# Patient Record
Sex: MALE | Race: WHITE
[De-identification: names, ages, dates, MRNs, and addresses within clinical notes are randomized per-mention and may not be internally consistent; named-entity substitution may affect disease eponyms.]

---

## 2020-02-06 ENCOUNTER — HOSPITAL ENCOUNTER (EMERGENCY)
Dept: HOSPITAL 46 - ED | Age: 50
Discharge: HOME | End: 2020-02-06
Payer: COMMERCIAL

## 2020-02-06 VITALS — BODY MASS INDEX: 44.67 KG/M2 | HEIGHT: 70 IN | WEIGHT: 312 LBS

## 2020-02-06 DIAGNOSIS — E87.6: ICD-10-CM

## 2020-02-06 DIAGNOSIS — I10: ICD-10-CM

## 2020-02-06 DIAGNOSIS — I49.3: Primary | ICD-10-CM

## 2020-02-06 NOTE — EKG
[facility]
                                    [address] [place] [address]
                                  [place]  [address]
_________________________________________________________________________________________
                                                                 Signed   
 
 
Sinus rhythm with frequent premature ventricular complexes
Minimal voltage criteria for LVH, may be normal variant
Nonspecific T wave abnormality
Abnormal ECG
No previous ECGs available
Confirmed by RAVINDER RAMIREZ MD ([id number]) on 2/6/2020 10:27:54 PM
 
 
 
 
 
 
 
 
 
 
 
 
 
 
 
 
 
 
 
 
 
 
 
 
 
 
 
 
 
 
 
 
 
 
 
 
 
 
 
    Electronically Signed By: RAVINDER RAMIREZ MD  02/06/20 2228
_________________________________________________________________________________________
PATIENT NAME:     LJCAMMIECARLOS TAYLOR                     
MEDICAL RECORD #: [id number]                     Electrocardiogram             
          ACCT #: [id number]  
DATE OF BIRTH:   [birth year]                                       
PHYSICIAN:   RAVINDER RAMIREZ MD                   REPORT #: [id number]
REPORT IS CONFIDENTIAL AND NOT TO BE RELEASED WITHOUT AUTHORIZATION

## 2021-04-02 ENCOUNTER — HOSPITAL ENCOUNTER (EMERGENCY)
Dept: HOSPITAL 46 - ED | Age: 51
Discharge: HOME | End: 2021-04-02
Payer: COMMERCIAL

## 2021-04-02 VITALS — WEIGHT: 304 LBS | HEIGHT: 70 IN | BODY MASS INDEX: 43.52 KG/M2

## 2021-04-02 DIAGNOSIS — Z88.0: ICD-10-CM

## 2021-04-02 DIAGNOSIS — I10: ICD-10-CM

## 2021-04-02 DIAGNOSIS — N13.2: Primary | ICD-10-CM

## 2021-04-02 DIAGNOSIS — Z79.899: ICD-10-CM

## 2023-07-14 ENCOUNTER — HOSPITAL ENCOUNTER (EMERGENCY)
Dept: HOSPITAL 46 - ED | Age: 53
Discharge: HOME | End: 2023-07-14
Payer: COMMERCIAL

## 2023-07-14 VITALS — HEIGHT: 70 IN | WEIGHT: 300.49 LBS | BODY MASS INDEX: 43.02 KG/M2

## 2023-07-14 VITALS — DIASTOLIC BLOOD PRESSURE: 85 MMHG | SYSTOLIC BLOOD PRESSURE: 152 MMHG

## 2023-07-14 DIAGNOSIS — R25.2: ICD-10-CM

## 2023-07-14 DIAGNOSIS — Z88.0: ICD-10-CM

## 2023-07-14 DIAGNOSIS — G47.30: ICD-10-CM

## 2023-07-14 DIAGNOSIS — R10.11: Primary | ICD-10-CM

## 2023-07-14 DIAGNOSIS — I10: ICD-10-CM

## 2023-07-14 DIAGNOSIS — Z88.8: ICD-10-CM

## 2023-07-14 DIAGNOSIS — Z79.899: ICD-10-CM

## 2023-07-14 NOTE — XMS
Continuity of Care Document
  Created on: 2023
 
 CAMMIE CALHOUN
 External Reference #: [id number]
 : 66
 Sex: Male
 
 Demographics
 
 
+-----------------------+----------------------+
| Address               | [address]      |
|                       | [place]  [address] |
+-----------------------+----------------------+
| Preferred Language    | Unknown              |
+-----------------------+----------------------+
| Marital Status        |               |
+-----------------------+----------------------+
| Caodaism Affiliation | Unknown              |
+-----------------------+----------------------+
| Race                  | White                |
+-----------------------+----------------------+
| Ethnic Group          | Unknown              |
+-----------------------+----------------------+
 
 
 Author
 
 
+--------------+--------------------------+
| Author       | [facility]                 |
+--------------+--------------------------+
| Organization | [facility]                 |
+--------------+--------------------------+
| Address      |  [address] |
|              | [place]  [address]     |
+--------------+--------------------------+
| Phone        | [phone]          |
+--------------+--------------------------+
 
 
 
 Care Team Providers
 
 
+-----------------------+-------------+-------------+
| Care Team Member Name | Role        | Phone       |
+-----------------------+-------------+-------------+
 Unavailable | Unavailable |
+-----------------------+-------------+-------------+
 
 
 
 Allergies
 No information.
 
 Encounters
 
 No information.
 
 Functional Status
 No information.
 
 Immunizations
 No information.
 
 Medications
 No information.
 
 Problems
 
 
+--------------------+-----------------------+------------+
|  date              |  description          |  facility  |
+--------------------+-----------------------+------------+
|  2022 10:59  |  Essential (primary)  |  [facility]       |
|                    | hypertension          |            |
+--------------------+-----------------------+------------+
|  2022 10:59  |  OTHER CHEST PAIN     |  [facility]       |
+--------------------+-----------------------+------------+
 
 
 
 Procedures
 No information.
 
 Results/Labs
 No information.
 
 Social History
 No information.
 
 Vital Signs
 No information.

## 2023-07-16 NOTE — EKG
[facility]
                                    [address]
                                  [place]  [address]
_________________________________________________________________________________________
                                                                 Signed   
 
 
Normal sinus rhythm
Normal ECG
When compared with ECG of 06-FEB-2020 10:50,
premature ventricular complexes are no longer present
 
Confirmed by ZOFIA FRANCO MD ([id number]) on 7/16/2023 6:51:52 AM
 
 
 
 
 
 
 
 
 
 
 
 
 
 
 
 
 
 
 
 
 
 
 
 
 
 
 
 
 
 
 
 
 
 
 
 
 
 
 
    Electronically Signed By: ZOFIA FRANCO  07/16/23 0652
_________________________________________________________________________________________
PATIENT NAME:     CAMMIE CALHOUN BRANDON                     
MEDICAL RECORD #: [id number]                     Electrocardiogram             
          ACCT #: [id number]  
DATE OF BIRTH:   [birth year]                                       
PHYSICIAN:   ZOFIA FRANCO                       REPORT #: [id number]
REPORT IS CONFIDENTIAL AND NOT TO BE RELEASED WITHOUT AUTHORIZATION

## 2024-07-23 VITALS — DIASTOLIC BLOOD PRESSURE: 92 MMHG | SYSTOLIC BLOOD PRESSURE: 170 MMHG

## 2024-07-25 ENCOUNTER — HOSPITAL ENCOUNTER (OUTPATIENT)
Dept: HOSPITAL 46 - DS | Age: 54
Discharge: HOME | End: 2024-07-25
Attending: SURGERY
Payer: COMMERCIAL

## 2024-07-25 VITALS — DIASTOLIC BLOOD PRESSURE: 68 MMHG | SYSTOLIC BLOOD PRESSURE: 132 MMHG

## 2024-07-25 VITALS — BODY MASS INDEX: 38.73 KG/M2 | HEIGHT: 70 IN | WEIGHT: 270.55 LBS

## 2024-07-25 VITALS — SYSTOLIC BLOOD PRESSURE: 140 MMHG | DIASTOLIC BLOOD PRESSURE: 70 MMHG

## 2024-07-25 VITALS — DIASTOLIC BLOOD PRESSURE: 81 MMHG | SYSTOLIC BLOOD PRESSURE: 142 MMHG

## 2024-07-25 DIAGNOSIS — K76.0: ICD-10-CM

## 2024-07-25 DIAGNOSIS — K80.10: Primary | ICD-10-CM

## 2024-07-25 DIAGNOSIS — Z79.899: ICD-10-CM

## 2024-07-25 DIAGNOSIS — E66.01: ICD-10-CM

## 2024-07-25 DIAGNOSIS — Z88.0: ICD-10-CM

## 2024-07-25 DIAGNOSIS — I10: ICD-10-CM

## 2024-07-25 PROCEDURE — 0FB23ZX EXCISION OF LEFT LOBE LIVER, PERCUTANEOUS APPROACH, DIAGNOSTIC: ICD-10-PCS | Performed by: SURGERY

## 2024-07-25 PROCEDURE — 0FT44ZZ RESECTION OF GALLBLADDER, PERCUTANEOUS ENDOSCOPIC APPROACH: ICD-10-PCS | Performed by: SURGERY

## 2024-07-25 NOTE — NUR
1249 PT IV DISCONTINUED FOR DISCHARGE
1250 PT DISCHARGED FROM DAY SURGERY VIA WHEELCHAIR TO THE FRONT OF THE
HOSPITAL TO PT'S WIFE'S CAR.

## 2024-07-25 NOTE — NUR
1050 PT ARRIVED TO DAY SURGERY FROM PACU VIA STRECHER. PT DROWSEY WITH CPAP
ATTACHED. PT REPORTS 4/10 PAIN. PT REPORTS OCCASIONAL NAUSEA WHEN MOVING. PT
BREATHING EQUAL AND UNLABORED. REPORT TAKEN FROM EMMANUEL CARRENO RN.
1100 PT STATES URGE TO URINATE. PT ABLE TO AMBULATE TO BATHROOM AND URINATE
700 MLS OF URINE. PT BACK IN BED TOLERATING PO JELLO AND WATER. PT HAS CPAP ON
FOR WHEN FEELING DROWSEY. PT HAS CALL LIGHT WITHIN REACH AND PERSONAL ITEMS
WITHIN REACH AND WIFE AT BEDSIDE.

## 2024-07-25 NOTE — NUR
07/25/24 1001 Janelle Trejo
3530-PT ARRIVED TO PACU ON 8L/MASK AND UNRESPONSIVE TO NOXIOUS
STIMULI. RR EVEN AND UNLABORED. MILD SNORING NOTED AT TIMES.
VISUALIZED SRGICAL SITES. PT WITH 5 LAP SITES, 4 IN RUQ, 1 AT
UMBILLICUS. LATERAL RUQ LAP SITE WITH SMALL AMT OF SANGUINEOUS
DRAINAGE THAT SEEPED THROUGH GOWN AND ONTO BLANKET. 1 OF THE SS NOTED
TO BE LOOSE AND TWISTED. DR. MIGUEL WITH DIRECTIONS TO REINFORCE LOOSE
SS. COMPLETED.

## 2024-07-25 NOTE — OR
[facility]
                                    [address], [place]  [address]
_________________________________________________________________________________________
                                                                 Signed   
 
 
DATE OF OPERATION:
07/25/2024
 
SURGEON:
Eliecer Miguel MD
 
PREOPERATIVE DIAGNOSES:
1. Chronic calculous cholecystitis.
2. Morbid obesity.
3. Fatty liver.
 
POSTOPERATIVE DIAGNOSES:
1. Chronic calculous cholecystitis.
2. Morbid obesity.
3. Fatty liver.
 
PROCEDURES:
1. Laparoscopic cholecystectomy with intraoperative cholangiogram, prolonged.
2. Liver biopsy.
3. Surgeon-directed fluoroscopy.
 
ANESTHESIA:
General endotracheal, Kerry Mendezerson CRNA.
 
INDICATION:
This 54-year-old morbidly obese white man is a patient of Dr. Cara Kwan.  He has
had abdominal pain that was evaluated by gastroenterologist, Dr. Joshi in the [facility]
including upper endoscopy and colonoscopy.  There were no findings of note.  Dr. Kwan continued the GI workup with gallbladder ultrasound performed on March 27, 2024
showing a gallstone without acute cholecystitis and also fatty liver.  He does have
symptoms highly suggestive of episodic biliary colic.  This includes a "stomach ache."
Bloating and fullness particularly after eating.  He is admitted at this time to undergo
cholecystectomy preferred by a laparoscopic approach.  He understands the risk of
bleeding, infection, bile duct injury, need for open procedure and of course failure to
cure his symptoms.  Additionally, a liver biopsy is anticipated given the fatty liver to
assess possible steatohepatitis.  The patient understands the risk of the operation and
wished to proceed. 
 
FINDINGS:
Indeed the liver was quite fatty.  The gallbladder was relatively small in comparison.
The gallbladder was chronically inflamed.  Once excised, a single oblong dark green
stone was noted.  Chronic inflammatory changes of the mucosa of the gallbladder were
 
    Electronically Signed By: ELIECER MIGUEL MD  07/25/24 1247
_________________________________________________________________________________________
PATIENT NAME:     CAMMIE CALHOUN                     
MEDICAL RECORD #: [id number]            OPERATIVE REPORT              
          ACCT #: [id number]  
DATE OF BIRTH:   [birth year]            REPORT #: [id number]      
PHYSICIAN:        ELIECER MIGUEL MD                 
PCP:              CARA KWAN MD      
REPORT IS CONFIDENTIAL AND NOT TO BE RELEASED WITHOUT AUTHORIZATION
 
 
                                  [facility]
                                    [address], [place]  [address]
_________________________________________________________________________________________
                                                                 Signed   
 
 
noted as well.  Cholangiogram was normal.  Liver biopsy was accomplished in the left
lateral segment of liver.  There were no other findings of concern. 
 
The operation was somewhat prolonged and difficult related to his obesity, but was
accomplished safely. 
 
DESCRIPTION OF PROCEDURE:
The patient was brought to the operating room, given a general endotracheal anesthetic.
His airway was somewhat difficult, but intubation was accomplished without problem.  The
abdomen was prepared with a chlorhexidine solution after clipping and draped sterilely.
An infraumbilical incision was made and using an open Socrates cannula technique
pneumoperitoneum was achieved to a level of 14 mmHg of carbon dioxide gas.
Intra-abdominal inspection showed no sign of ascites or carcinomatosis.  The liver was
fatty with markedly blunted edge.  The gallbladder was obscured from view at that point. 
 
Three additional trocars were placed in their usual configuration in subxiphoid, right
midclavicular, and right anterior axillary line.  Gallbladder was elevated cephalad
against a heavy and fatty liver.  The infundibulum was grasped and retracted laterally
and using careful blunt and electrocautery dissection, the triangle of Calot was
dissected free ultimately identifying the cystic duct.  A clip was applied across
gallbladder cystic duct junction and a transverse choledochotomy made in the cystic
duct.  Egress of thickened bile was noted.  Using an Moreira type cholangiocatheter with
surgeon directed fluoroscopy intraoperative cholangiography was undertaken showing free
flow of contrast into the duodenum with no sign of filling defect.  Retrograde filling
was not immediately forthcoming and therefore morphine was administered IV, which
allowed for repeat cholangiography showing the more proximal biliary tree to be normal.
The catheter was removed and the cystic duct was triply clipped and divided.  The
gallbladder was dissected free in a retrograde fashion.  A small clip was applied to a
liver bed area of arterial bleeding and cystic arterial branches were doubly clipped
prior to division as well.  The gallbladder was dissected free completely from the liver
without entry into the gallbladder.  An endobag was used to extract the gallbladder from
the infraumbilical port site.  The gallbladder was opened on the back table and found to
have a single elongated green gallstone approximately 1.2 cm in length with marked
chronic inflammation of mucosa itself.  There was no sign of neoplasm. 
 
Irrigation was undertaken in subhepatic space showing no sign of bile leak or bleeding. 
 
A percutaneous biopsy using a __________ gun device was used to provide a liver biopsy
from the left lateral segment of liver.  The puncture site was secured with hemostasis
with electrocautery.  Reinspection of subhepatic space showed no sign of bile leak or
bleeding, but given the extent of dissection and the somewhat intrahepatic nature of the
gallbladder to begin with.  Sergio was applied to the liver bed as well as over the
 
    Electronically Signed By: ELIECER MIGUEL MD  07/25/24 1247
_________________________________________________________________________________________
PATIENT NAME:     CAMMIE CALHOUN                     
MEDICAL RECORD #: [id number]            OPERATIVE REPORT              
          ACCT #: [id number]  
DATE OF BIRTH:   [birth year]            REPORT #: [id number]      
PHYSICIAN:        ELIECER MIGUEL MD                 
PCP:              CARA KWAN MD      
REPORT IS CONFIDENTIAL AND NOT TO BE RELEASED WITHOUT AUTHORIZATION
 
 
                                  [address]  [address]
_________________________________________________________________________________________
                                                                 Signed   
 
 
biopsy site.  Excess irrigation fluid was suctioned free.  The trocars were removed
under direct visualization showing no sign of bleeding. 
 
The infraumbilical fascial incision was reapproximated with interrupted 0 Vicryl suture.
 An additional 0 PDS suture was used to secure it more fully given his obesity.
Irrigation was undertaken and 10 mL of 0.25% Marcaine with epinephrine was injected
locally.  The skin was closed with interrupted 3-0 Vicryl.  Steri-Strips were applied.
The patient was ultimately extubated and transferred to the recovery room in good
condition having suffered no complications.  Sponge, needle, and instrument counts were 
reported as correct x3.  The operation was difficult on the basis primarily of his
obesity, but was accomplished safely. \E\ 
 
 
 
            ________________________________________
            MD ALBERTO Mendez/CECILIA
Job #:  [id number]
DD:  07/25/2024 09:37:01
DT:  07/25/2024 10:24:49
 
cc:            Cara Kwan MD
 
 
Copies:  CAAR KWAN MD
~
 
 
 
 
 
 
 
 
 
 
 
 
 
 
 
    Electronically Signed By: ELIECER MIGUEL MD  07/25/24 1247
_________________________________________________________________________________________
PATIENT NAME:     CAMMIE CALHOUN                     
MEDICAL RECORD #: [id number]            OPERATIVE REPORT              
          ACCT #: [id number]  
DATE OF BIRTH:   [birth year]            REPORT #: [id number]      
PHYSICIAN:        ELIECER MIGUEL MD                 
PCP:              CARA KWAN MD      
REPORT IS CONFIDENTIAL AND NOT TO BE RELEASED WITHOUT AUTHORIZATION

## 2024-07-25 NOTE — NUR
1235 VITALS TAKEN. HOURLY ROUNDING DONE. DISHCRGE INFORMATION GONE OVER WITH
WIFE AT BEDSIDE. PT REPORTS TOLERABLE 3/10 PAIN. PT AND FAMILY HAVE NO
QUESTIONS AT THIS TIME. UMBILICAL LAP SITE REINFORCED WITH MORE STERI STRIPS.
1240 PT GETTING DRESSED WITH WIFE AT BEDSIDE.

## 2024-07-25 NOTE — NUR
1140 HOURLY ROUNDING DONE. PT WIFE TOOK PRESCRIPTION DOWN TO PHARMACY. PT
VITALS TAKEN. PT REPORTS UNTOLARABLE LEVEL OF PAIN AT 5/10. PAIN MEDICATION
GIVEN RECENTLY. IV ASSESSED. PT REPORTS NO NAUSEA. PT RESTING IN BED WITH CPAP
MACHINE RUNNING. PT HAS CALL LIGHT WITHIN REACH AND PERSONAL ITEMS WITHIN
REACH.

## 2024-07-29 NOTE — PATH
[facility]
                                    [address] [place], [place]  [address]
_________________________________________________________________________________________
                                                                 Signed   
 
 
 
SPECIMEN(S): A GALLBLADDER WITH STONE
SPECIMEN(S): B LEFT LOBE OF LIVER
 
SPECIMEN SOURCE:
A. GALLBLADDER WITH STONE
B. LEFT LOBE OF LIVER
 
CLINICAL HISTORY:
A) chronic cholecystitis with calculus
 
FINAL PATHOLOGIC DIAGNOSIS:
A. Gallbladder, cholecystectomy:
-  Chronic cholecystitis with cholelithiasis.
B. Left lobe liver, needle core biopsy:
-  Mild steatosis without evidence of steatohepatitis, increased iron or 
cirrhosis.  See microscopic examination. 
AMB
 
MICROSCOPIC EXAMINATION:
Histologic sections of all submitted blocks are examined by light microscopy.  
These findings, together with the gross examination, support the pathologic 
diagnosis. 
B.  A single biopsy of benign hepatic tissue is reviewed, containing adequate 
numbers of portal triads for evaluation.  The portal tracts are notable for 
mildly increased chronic inflammation, without 
evidence of interface hepatitis or bile duct injury.  Cholestasis is not 
appreciated.  The hepatic lobules are notable for mild large droplet steatosis 
without steatohepatitis.  Trichrome and 
reticulin stains highlight a normal amount of increased periportal fibrosis, 
without evidence of septal fibrosis, architectural distortion or cirrhosis.  
Iron stain reveals no evidence of increased 
hepatocellular iron, and PAS with diastase reveals no intracellular inclusions. 
 There is no evidence of malignancy. 
AMB
 
GROSS DESCRIPTION:
A. The specimen, labeled and designated "Shante, gallbladder with stone," is 
received in formalin and consists of 
Specimen: Previously opened gallbladder.
Dimensions: 7.3 x 3.5 x 1.7 cm.
Serosa: Green-tan and smooth.
 
                                                                                    
_________________________________________________________________________________________
PATIENT NAME:     CAMMIE CALHOUN                     
MEDICAL RECORD #: [id number]            PATHOLOGY                     
          ACCT #: [id number]       ACCESSION #: [id number]     
DATE OF BIRTH:   [birth year]            REPORT #: [id number]       
PHYSICIAN:        NOEMI GARVEY              
PCP:              CARA LAGUNAS MD      
REPORT IS CONFIDENTIAL AND NOT TO BE RELEASED WITHOUT AUTHORIZATION
 
 
                                  [facility]
                                    [address], [place]  [address]
_________________________________________________________________________________________
                                                                 Signed   
 
 
Cystic Duct: Unobstructed, margin inked black and shaved.
Calculi: Multiple black-brown smooth calculi (0.1-0.2 cm in greatest dimension, 
and 0.8 x 0.5 x 0.3 cm and 1.1 x 0.8 x 0.4 cm). 
Mucosa: Green-brown and velvety.
Wall thickness: 0.2-0.6 cm.
Lymph node: One green-tan possible pericystic lymph node (0.8 x 0.5 x 0.5 cm).  
The tissue is submitted in toto in cassette (A2).. 
Additional: None.
Representative sections are submitted
Cassette Summary:
(A1)     gallbladder wall and cystic duct margin
(A2)     possible pericystic lymph node
B. The specimen, labeled and designated "Shante, left lobe of liver," is 
received in formalin and consists of one core of tan soft tissue (1.7 cm in 
greatest dimension.  The tissue is stained with 
 
eosin, and the specimen is submitted entirely in cassette (B1).
VB  (under the direct supervision of a pathologist)
The Gross Description was prepared using a voice recognition system. The report 
was reviewed for accuracy; however, sound-alike word errors, addition and/or 
deletions may occur. If there is any 
question about this report, please contact Client Services.
 
ADDITIONAL NOTES:
Immunohistochemical and/or in situ hybridization studies if performed in this 
case included appropriate positive controls that reacted as expected.  This 
test was developed and its performance 
characteristics determined by [facility].  It has not been cleared or 
approved by the U.S. Food and Drug Administration.  The FDA has determined that 
such clearance or approval is not 
necessary.  This test is used for clinical purposes.  It should not be regarded 
as investigational or for research.  [facility] is certified under the 
Clinical Laboratory Improvement 
Amendments of 1988 (CLIA) as qualified to perform high complexity clinical 
laboratory testing. 
 
PERFORMING LABORATORY:
Technical component was performed by [facility], [address] [place] [address] (CLIA# [id number]). Professional interpretation was 
performed by [facility] [address] [facility] [id number]
 
 
                                                                                    
_________________________________________________________________________________________
PATIENT NAME:     CAMMIE CALHOUN                     
MEDICAL RECORD #: [id number]            PATHOLOGY                     
          ACCT #: [id number]       ACCESSION #: [id number]     
DATE OF BIRTH:   [birth year]            REPORT #: [id number]       
PHYSICIAN:        NOEMI GARVEY              
PCP:              CARA LAGUNAS MD      
REPORT IS CONFIDENTIAL AND NOT TO BE RELEASED WITHOUT AUTHORIZATION
 
 
                                  [facility]
                                    [address] [place] [address]  [address]
_________________________________________________________________________________________
                                                                 Signed   
 
 
Diagnostician:  Baylee Shields MD
Pathologist
Electronically Signed 07/29/2024
 
 
Copies:                                
~
 
 
 
 
 
 
 
 
 
 
 
 
 
 
 
 
 
 
 
 
 
 
 
 
 
 
 
 
 
 
 
 
 
 
 
 
                                                                                    
_________________________________________________________________________________________
PATIENT NAME:     CAMMIE CALHOUN                     
MEDICAL RECORD #: [id number]            PATHOLOGY                     
          ACCT #: [id number]       ACCESSION #: [id number]     
DATE OF BIRTH:   [birth year]            REPORT #: [id number]       
PHYSICIAN:        NOEMI GARVEY              
PCP:              CARA LAGUNAS MD      
REPORT IS CONFIDENTIAL AND NOT TO BE RELEASED WITHOUT AUTHORIZATION